# Patient Record
Sex: FEMALE | Race: WHITE | Employment: OTHER | URBAN - METROPOLITAN AREA
[De-identification: names, ages, dates, MRNs, and addresses within clinical notes are randomized per-mention and may not be internally consistent; named-entity substitution may affect disease eponyms.]

---

## 2017-01-01 ENCOUNTER — TELEPHONE (OUTPATIENT)
Dept: ONCOLOGY | Age: 67
End: 2017-01-01

## 2017-01-01 ENCOUNTER — APPOINTMENT (OUTPATIENT)
Dept: INFUSION THERAPY | Age: 67
End: 2017-01-01

## 2017-01-01 ENCOUNTER — DOCUMENTATION ONLY (OUTPATIENT)
Dept: ONCOLOGY | Age: 67
End: 2017-01-01

## 2017-01-01 ENCOUNTER — PATIENT OUTREACH (OUTPATIENT)
Dept: ONCOLOGY | Age: 67
End: 2017-01-01

## 2017-01-01 ENCOUNTER — TELEPHONE (OUTPATIENT)
Dept: ENDOCRINOLOGY | Age: 67
End: 2017-01-01

## 2017-01-01 ENCOUNTER — HOSPITAL ENCOUNTER (OUTPATIENT)
Dept: INFUSION THERAPY | Age: 67
Discharge: HOME | End: 2017-02-08
Payer: MEDICARE

## 2017-01-01 ENCOUNTER — OFFICE VISIT (OUTPATIENT)
Dept: ONCOLOGY | Age: 67
End: 2017-01-01

## 2017-01-01 ENCOUNTER — HOSPITAL ENCOUNTER (OUTPATIENT)
Dept: MRI IMAGING | Age: 67
Discharge: HOME | End: 2017-01-16
Attending: INTERNAL MEDICINE
Payer: MEDICARE

## 2017-01-01 ENCOUNTER — HOSPITAL ENCOUNTER (OUTPATIENT)
Dept: INFUSION THERAPY | Age: 67
End: 2017-01-01
Payer: MEDICARE

## 2017-01-01 VITALS
TEMPERATURE: 97.8 F | HEART RATE: 93 BPM | HEIGHT: 62 IN | OXYGEN SATURATION: 95 % | RESPIRATION RATE: 16 BRPM | SYSTOLIC BLOOD PRESSURE: 119 MMHG | DIASTOLIC BLOOD PRESSURE: 91 MMHG

## 2017-01-01 VITALS
TEMPERATURE: 97.3 F | DIASTOLIC BLOOD PRESSURE: 90 MMHG | SYSTOLIC BLOOD PRESSURE: 144 MMHG | WEIGHT: 263 LBS | HEIGHT: 62 IN | BODY MASS INDEX: 48.4 KG/M2 | OXYGEN SATURATION: 98 % | RESPIRATION RATE: 20 BRPM | HEART RATE: 87 BPM

## 2017-01-01 VITALS
OXYGEN SATURATION: 95 % | SYSTOLIC BLOOD PRESSURE: 129 MMHG | HEART RATE: 88 BPM | RESPIRATION RATE: 16 BRPM | TEMPERATURE: 97 F | DIASTOLIC BLOOD PRESSURE: 88 MMHG

## 2017-01-01 VITALS — BODY MASS INDEX: 48.47 KG/M2 | WEIGHT: 265 LBS

## 2017-01-01 DIAGNOSIS — R19.7 DIARRHEA, UNSPECIFIED TYPE: Primary | ICD-10-CM

## 2017-01-01 DIAGNOSIS — G93.5 NEOPLASM OF BRAIN CAUSING MASS EFFECT ON ADJACENT STRUCTURES (HCC): ICD-10-CM

## 2017-01-01 DIAGNOSIS — G93.5 NEOPLASM OF BRAIN CAUSING MASS EFFECT ON ADJACENT STRUCTURES (HCC): Primary | ICD-10-CM

## 2017-01-01 DIAGNOSIS — R06.09 DOE (DYSPNEA ON EXERTION): ICD-10-CM

## 2017-01-01 DIAGNOSIS — C34.90 MALIGNANT NEOPLASM OF LUNG, UNSPECIFIED LATERALITY, UNSPECIFIED PART OF LUNG (HCC): Primary | ICD-10-CM

## 2017-01-01 DIAGNOSIS — C34.11 MALIGNANT NEOPLASM OF UPPER LOBE OF RIGHT LUNG (HCC): Primary | ICD-10-CM

## 2017-01-01 DIAGNOSIS — C79.31 BRAIN METASTASES (HCC): ICD-10-CM

## 2017-01-01 DIAGNOSIS — T45.1X5A CARDIOMYOPATHY DUE TO CHEMOTHERAPY (HCC): ICD-10-CM

## 2017-01-01 DIAGNOSIS — I42.7 CARDIOMYOPATHY DUE TO CHEMOTHERAPY (HCC): ICD-10-CM

## 2017-01-01 DIAGNOSIS — C79.31 METASTATIC CANCER TO BRAIN (HCC): ICD-10-CM

## 2017-01-01 DIAGNOSIS — R53.1 GENERALIZED WEAKNESS: ICD-10-CM

## 2017-01-01 DIAGNOSIS — D49.6 NEOPLASM OF BRAIN CAUSING MASS EFFECT ON ADJACENT STRUCTURES (HCC): Primary | ICD-10-CM

## 2017-01-01 DIAGNOSIS — E27.49 IATROGENIC ADRENAL INSUFFICIENCY (HCC): ICD-10-CM

## 2017-01-01 DIAGNOSIS — M54.5 BILATERAL LOW BACK PAIN, UNSPECIFIED CHRONICITY, WITH SCIATICA PRESENCE UNSPECIFIED: ICD-10-CM

## 2017-01-01 DIAGNOSIS — R26.89 BALANCE PROBLEMS: ICD-10-CM

## 2017-01-01 DIAGNOSIS — D49.6 NEOPLASM OF BRAIN CAUSING MASS EFFECT ON ADJACENT STRUCTURES (HCC): ICD-10-CM

## 2017-01-01 DIAGNOSIS — C34.11 MALIGNANT NEOPLASM OF UPPER LOBE OF RIGHT LUNG (HCC): ICD-10-CM

## 2017-01-01 DIAGNOSIS — R41.3 MEMORY CHANGES: ICD-10-CM

## 2017-01-01 DIAGNOSIS — I50.22 CHRONIC SYSTOLIC HEART FAILURE (HCC): ICD-10-CM

## 2017-01-01 DIAGNOSIS — R53.1 WEAKNESS GENERALIZED: ICD-10-CM

## 2017-01-01 DIAGNOSIS — Z92.3 HISTORY OF THERAPEUTIC RADIATION: ICD-10-CM

## 2017-01-01 DIAGNOSIS — R53.0 NEOPLASTIC MALIGNANT RELATED FATIGUE: ICD-10-CM

## 2017-01-01 DIAGNOSIS — C34.91 LOCAL RECURRENCE OF CANCER OF RIGHT LUNG (HCC): ICD-10-CM

## 2017-01-01 DIAGNOSIS — R05.9 COUGH: ICD-10-CM

## 2017-01-01 LAB
ALBUMIN SERPL BCP-MCNC: 2.8 G/DL (ref 3.5–5)
ALBUMIN/GLOB SERPL: 1.2 {RATIO} (ref 1.1–2.2)
ALP SERPL-CCNC: 68 U/L (ref 45–117)
ALT SERPL-CCNC: 20 U/L (ref 12–78)
ANION GAP BLD CALC-SCNC: 12 MMOL/L (ref 5–15)
AST SERPL W P-5'-P-CCNC: 6 U/L (ref 15–37)
BASOPHILS # BLD AUTO: 0 K/UL (ref 0–0.1)
BASOPHILS # BLD: 0 % (ref 0–1)
BILIRUB SERPL-MCNC: 1.3 MG/DL (ref 0.2–1)
BUN SERPL-MCNC: 33 MG/DL (ref 6–20)
BUN/CREAT SERPL: 30 (ref 12–20)
CALCIUM SERPL-MCNC: 8.5 MG/DL (ref 8.5–10.1)
CHLORIDE SERPL-SCNC: 96 MMOL/L (ref 97–108)
CO2 SERPL-SCNC: 26 MMOL/L (ref 21–32)
CREAT SERPL-MCNC: 1.11 MG/DL (ref 0.55–1.02)
DIFFERENTIAL METHOD BLD: ABNORMAL
EOSINOPHIL # BLD: 0 K/UL (ref 0–0.4)
EOSINOPHIL NFR BLD: 0 % (ref 0–7)
ERYTHROCYTE [DISTWIDTH] IN BLOOD BY AUTOMATED COUNT: 15.2 % (ref 11.5–14.5)
GLOBULIN SER CALC-MCNC: 2.4 G/DL (ref 2–4)
GLUCOSE SERPL-MCNC: 354 MG/DL (ref 65–100)
HCT VFR BLD AUTO: 33.5 % (ref 35–47)
HGB BLD-MCNC: 11.2 G/DL (ref 11.5–16)
LYMPHOCYTES # BLD AUTO: 5 % (ref 12–49)
LYMPHOCYTES # BLD: 0.4 K/UL (ref 0.8–3.5)
MAGNESIUM SERPL-MCNC: 1.7 MG/DL (ref 1.6–2.4)
MCH RBC QN AUTO: 30.9 PG (ref 26–34)
MCHC RBC AUTO-ENTMCNC: 33.4 G/DL (ref 30–36.5)
MCV RBC AUTO: 92.3 FL (ref 80–99)
MONOCYTES # BLD: 0.1 K/UL (ref 0–1)
MONOCYTES NFR BLD AUTO: 1 % (ref 5–13)
NEUTS SEG # BLD: 6.6 K/UL (ref 1.8–8)
NEUTS SEG NFR BLD AUTO: 94 % (ref 32–75)
PLATELET # BLD AUTO: 93 K/UL (ref 150–400)
POTASSIUM SERPL-SCNC: 4.1 MMOL/L (ref 3.5–5.1)
PROT SERPL-MCNC: 5.2 G/DL (ref 6.4–8.2)
RBC # BLD AUTO: 3.63 M/UL (ref 3.8–5.2)
RBC MORPH BLD: ABNORMAL
SODIUM SERPL-SCNC: 134 MMOL/L (ref 136–145)
WBC # BLD AUTO: 7.1 K/UL (ref 3.6–11)

## 2017-01-01 PROCEDURE — 74011250636 HC RX REV CODE- 250/636: Performed by: INTERNAL MEDICINE

## 2017-01-01 PROCEDURE — 74011250636 HC RX REV CODE- 250/636: Performed by: NURSE PRACTITIONER

## 2017-01-01 PROCEDURE — 96361 HYDRATE IV INFUSION ADD-ON: CPT

## 2017-01-01 PROCEDURE — 36415 COLL VENOUS BLD VENIPUNCTURE: CPT | Performed by: NURSE PRACTITIONER

## 2017-01-01 PROCEDURE — 70553 MRI BRAIN STEM W/O & W/DYE: CPT

## 2017-01-01 PROCEDURE — 80053 COMPREHEN METABOLIC PANEL: CPT | Performed by: NURSE PRACTITIONER

## 2017-01-01 PROCEDURE — 83735 ASSAY OF MAGNESIUM: CPT | Performed by: NURSE PRACTITIONER

## 2017-01-01 PROCEDURE — A9585 GADOBUTROL INJECTION: HCPCS | Performed by: INTERNAL MEDICINE

## 2017-01-01 PROCEDURE — 85025 COMPLETE CBC W/AUTO DIFF WBC: CPT | Performed by: NURSE PRACTITIONER

## 2017-01-01 PROCEDURE — 96360 HYDRATION IV INFUSION INIT: CPT

## 2017-01-01 RX ORDER — ONDANSETRON 8 MG/1
TABLET, ORALLY DISINTEGRATING ORAL
Qty: 30 TAB | Refills: 5 | Status: SHIPPED | OUTPATIENT
Start: 2017-01-01

## 2017-01-01 RX ORDER — DEXAMETHASONE 4 MG/1
4 TABLET ORAL 2 TIMES DAILY WITH MEALS
Qty: 60 TAB | Refills: 1 | Status: SHIPPED | OUTPATIENT
Start: 2017-01-01

## 2017-01-01 RX ORDER — FAMOTIDINE 20 MG/1
20 TABLET, FILM COATED ORAL 2 TIMES DAILY
Qty: 60 TAB | Refills: 1 | Status: SHIPPED | OUTPATIENT
Start: 2017-01-01

## 2017-01-01 RX ORDER — DEXAMETHASONE 4 MG/1
TABLET ORAL
Qty: 30 TAB | Refills: 1 | Status: CANCELLED | OUTPATIENT
Start: 2017-01-01

## 2017-01-01 RX ORDER — ACETAMINOPHEN 325 MG/1
TABLET ORAL
COMMUNITY

## 2017-01-01 RX ORDER — DEXAMETHASONE 2 MG/1
TABLET ORAL
Qty: 60 TAB | Refills: 1 | Status: SHIPPED | OUTPATIENT
Start: 2017-01-01

## 2017-01-01 RX ORDER — LEVETIRACETAM 500 MG/1
500 TABLET ORAL 2 TIMES DAILY
Qty: 60 TAB | Refills: 1 | Status: SHIPPED | OUTPATIENT
Start: 2017-01-01

## 2017-01-01 RX ADMIN — GADOBUTROL 12 ML: 604.72 INJECTION INTRAVENOUS at 12:14

## 2017-01-01 RX ADMIN — SODIUM CHLORIDE 1000 ML: 900 INJECTION, SOLUTION INTRAVENOUS at 13:15

## 2017-01-03 NOTE — TELEPHONE ENCOUNTER
Call from daughter/Annette. HIPAA verified. Stated patient is unable to remember anything. Stated taking 2 oxycodone daily per home health med set up and acetaminophen 3000 mg daily. Stated home health nurse advised to DC oxy use to see if memory improved. Stated patient is now taking acetaminophen daily and 1 oxy at night. Stated patient reports ongoing shoulder pain, but headache less. Also reported low back pain. Denied fever/chills/urgency. Reviewed sx to report to office and advised of 24 hour provider call service. Kimmy Marsh requested info on hospice and role discussed. Advised RN would forward to provider and navigator for assist also. Annette to call clinic tomorrow with status update/sooner if questions. Is aware to take patient to ED for acute changes if desired.   (18 min)

## 2017-01-03 NOTE — TELEPHONE ENCOUNTER
----- Message from Dandy Clay sent at 1/3/2017  9:10 AM EST -----  Regarding: Dr. Latha Shaw  Patient's daughter, Marleni Carrion, would like a call back within the next hour if possible to discuss her mom's condition. Best call back number is [phone].

## 2017-01-03 NOTE — TELEPHONE ENCOUNTER
Message left with Jahaira Zhang to contact [employer] about delivery of oral chemo. Call returned from daughter/Annette. Stated patient recd call from [employer] that iressa was approved. Gave contact # for pharmacy and requested call to clinic when med recd to review. Expressed concern that med needs to be monitored for administration due to patient's confusion. Daughter verbalized understanding and thanked for call.

## 2017-01-04 NOTE — TELEPHONE ENCOUNTER
Call from daughter Olla Simmonds. HIPAA verified. Stated patient is less confused today. Still having some back pain, but ok on tylenol. Reviewed sx of UTI to contact office or PCP. Verbalized understanding. Per daughter insurance will cover 35 hours of home health assist in home. Advised to have [facility] fax requested orders or call for any needs. Support given. Daughter will call with update 1/5/17.

## 2017-01-04 NOTE — TELEPHONE ENCOUNTER
Stewart Ventura, daughter, is returning a call to [facility] to report on patient's condition and information on in home care.

## 2017-01-04 NOTE — TELEPHONE ENCOUNTER
Call from daughter Grady Hall. Stated patient has not recd Iressa. Gave [place] contact # for patient reference. Call to [facility]/[phone]. Call connected x 2 then disconnected. Spoke with Jeannie Acevedo [phone]. Stated med is not on formulary. [facility] [phone] is home delivery pharmacy. Transferred to [facility]. Stated PA approved and medication should have shipped through [facility]. Per representative [facility] medication is not on formulary. Advised RN aware and need to know which pharmacy can vend medication as these are preferred though patient's insurance. Spoke with supervisor and recommendation was to contact  or other specialty pharmacy. RN requested that PA be faxed to our office. [phone]   (27  Min)      Call to Jessica @ [phone] to see if medication available. Fax:  [phone]. Script faxed complete. Call to daughter and advised that script faxed complete to [address] [place] [address]. Daughter will contact pharmacy also for pricing.

## 2017-01-05 NOTE — TELEPHONE ENCOUNTER
Call from Marybeth. States that [employer] medication has not yet been received. Anisha Rios states that Clara's back pain is \"not getting better\", and would like to know if this is concerning.

## 2017-01-05 NOTE — TELEPHONE ENCOUNTER
Call returned to Jeremy Marin. Spoke with Grady Hall. Stated ongoing low back pain. Is not certain of patient is constipated or having urinary sx. RN requested to verify sx and return call. Reviewed sx of UTI and to report fever. Verbalized understanding. To return call.

## 2017-01-05 NOTE — TELEPHONE ENCOUNTER
Call to [facility] [address] [facility]. [phone]. Stated can dispense Iressa. Chemo script and clinical info faxed complete to [facility] @ [phone]. Call returned to [place]. HIPAA verified. Advised of above action on iressa. Verbalized understanding. Stated patient reports back pain resolves during day. Reviewed sx to report to clinic. Family verbalized understanding of plan.   Thanked for assist.

## 2017-01-05 NOTE — TELEPHONE ENCOUNTER
Call to daughter to advise we will be working on the Iressa with [facility] tomorrow. Thanked for call.

## 2017-01-05 NOTE — TELEPHONE ENCOUNTER
[phone]    Call from daughter. Stated [facility] is unable to obtain iressa as is on recall. Gave 800 # supplied by pharmacy for contact. Call to above #. Jennifer. Provider must enroll in program:   [phone]. Medication not commercially available since September 2011. Can call the [place] line for options to Rx.  [phone]    To provider for direction.

## 2017-01-06 NOTE — PROGRESS NOTES
Returned daughter's call  She has been told by Medicare and the [employer] supplement that her mother can get support in home for up to 35 hours a week covered at 100%. The daughter has been unable to find a company that will do that. The home health company will send an aide for an hour twice a week. They feel their mother needs more than this due to intermittent confusion and forgetfulness. Active listening provided. Daughter spoke with their current home health company and they had a long conversation about hospice. Daughter feels she doesn't know her mother's prognosis well enough. She suspects that her mother will not live a year, but is unsure if she has less than 6 months. She would like this information to help them plan how to best care for her parents. NN will research process with [facility] and forward above request to providers. NN to follow up with daughter next week regarding information obtained from [employer].

## 2017-01-06 NOTE — TELEPHONE ENCOUNTER
Call to [facility] [address] [facility]. $6682 co-pay and pharmacy will assist with cortes. Requested pharmacy contact daughter Melinda Tran and supplied contact info. Thanked for assist.    Call to ramos Tran and advised of above and that will need income verification. Verbalized understanding and thanked for call.

## 2017-01-09 NOTE — TELEPHONE ENCOUNTER
Call from daughter [facility] re:  Clinical info for cortes given to obtain iressa. Support given.

## 2017-01-10 NOTE — TELEPHONE ENCOUNTER
Call from daughter Parisa Ayala per [facility]. Call lost and returned. Stated patient should receive Iressa 1/11/17. Advised FMLA complete and will fax. Requested 10 days off per month/updated. Offered sooner appt for follow up after MRI. Advised would forward to [facility] for appt.

## 2017-01-10 NOTE — TELEPHONE ENCOUNTER
Call to [address]. Stated is waiting for [employer]/co-pay assist processing. Stated co-pay is $2900 for 1st med delivery and then needs to pay 5% or $300/mo unless co-pay assist recd.       ([phone])

## 2017-01-11 NOTE — TELEPHONE ENCOUNTER
Pepcid can decrease plasma concentrations of the Iressa so it is not harmful for her to take the Iressa after taking the Pepcid today, but would like to avoid administration together in order to maximize absorption of Iressa. She can take the Iressa 6 hours before or 6 hours after Pepcid but should avoid closer timing between the 2 medications.

## 2017-01-11 NOTE — TELEPHONE ENCOUNTER
HIPAA verified. Stated was advised that pepcid may interact with iressa and that patient has taken pepcid today. Advised would clarify with provider and return call.   Thanked for assist.

## 2017-01-11 NOTE — TELEPHONE ENCOUNTER
HIPAA verified. Advised of NP note. Reported patient having spasms in back. Has MRI appt Monday and clinic appt Thursday. Daughter stated would try lidocaine patch to back to see if relief and call office with update. Suggested notes to patient regarding medication administration to decrease anxiety. Appreciative of assist.  Support given.

## 2017-01-12 NOTE — PROGRESS NOTES
[employer]  Medical Oncology at [address] [place] [address]   Nurse Navigator Note      Spoke with patient's daughter, Georgina. Discussed limitations of Medicare support for aides in the home. She and her sister are working together to interview/find help in patient's home. Reviewed patient's recent fall and ongoing safety concerns. Georgina will be unable to attend patient's upcoming appointment next week. She still desires the opportunity to speak with Dr. Kecia Machuca regarding prognosis to assist in planning future care for mother. NN will forward above request to provider. Daughter understands provider away until Tuesday, 1/17/17. Daughter verbalized appreciation for support and agrees to call with further questions or concerns.

## 2017-01-12 NOTE — TELEPHONE ENCOUNTER
Returned call to daughter Laly . HIPAA verified. States there were 2 sections on FMLA form that needed completion. Areas have been corrected & refaxed. Daughter confirms she is aware of patient's fall today per home therapist.  Daughter states she left work & went to patients home to check on her. Reports no visible injuries & no complaints voiced by patient. She plans to go back & check on her again later this afternoon. Advised to call this office for any changes or concerns. She verbalized understanding.

## 2017-01-12 NOTE — TELEPHONE ENCOUNTER
Judy Barrett, [facility] therapy is calling to report a patient fall. She states that when she went to patient's home, patient was with her caregiver and had fallen and hit her Right knee and also her Left temple. She states that rescue squad was called however patient refused to go to the ED. Judy Barrett also states that she feels like patient needs to have a bedside commode ordered due to patient's bathroom set up. Order can be faxed to [phone].

## 2017-01-16 NOTE — TELEPHONE ENCOUNTER
Called patient's daughter. The patient's back pain has been getting worse. She was with the patient this weekend and pain seems to be more constant. She reports it is also hard to decipher because she has [place] lot of anxiety and maybe a little bit of depression setting in.\" She does not want to take the oxycodone more than once at night because it makes her \"loopy. \" She feels there is regression and no progression. For example, patient did not remember she had seen her daughter the day before. Patient has also had several hallucinations (ie. Patient told her  she saw a dog pulling sleds). She reports hallucinations did not seem to coincide with starting the medication. Daughter feels a very \"candid conversation needs to be had with her. \" Patient fell last week and family is working to make sure something is with her full-time. Daughter reports she is \"not herself, this is now who she is. \" Daughter is not opposed to hospice and does not want patient to be in as much pain as she is in. Discussed that patient can 1/2 tablet oxycodone every 3-4 hours to see if she tolerates this better. We could also change to hydrocodone-acetaminophen as well to see if this is better tolerated. Daughter reports she will talk with the patient and her sister and let us know what is decided. Discussed referral to palliative care as well and Pablo Coronado says they will think about this. Asked if patient was off dexamethasone and Pablo Coronado will check. If she is off dexamethasone, she may need to restart to see if this helps with symptoms as well. Pablo Coronado will call back with update on dexamethasone. Patient has an appointment with Dr. Baron Goyal on 1/19/17 and will keep that at this time. Advised to call back with any further questions.

## 2017-01-16 NOTE — TELEPHONE ENCOUNTER
----- Message from Dean Headley sent at 1/16/2017  4:26 PM EST -----  Regarding: Patient call  Patient is requesting a call back in regards to her medication. She can be reached at [phone]. Thank you. Fortino De  --------------  Say Herrera, daughter, on HIPPA states that Dr. Laura Layton wants patient to take Decadron again and stop Hydrocortisone. She is on week 4 of Hydrocortisone taper. She wants to know if needs to wean Hydrocortisone prior to starting Decadron. Per Dr. Ariela Romo, [facility] to stop Hydrocortisone and start Decadron 4 mg BID, as prescribed by Dr. Laura Layton. . Daughter expressed understanding.

## 2017-01-16 NOTE — TELEPHONE ENCOUNTER
Call from daughter/Annette. HIPAA verified. Has questions about amount of funding [employer] will cover for iressa. Referred to follow up with [facility]. Verbalized understanding. Stated is concerned about ongoing back pain issues with patient and wanted to know if needs full body scan as is having brain MRI today. Reviewed pain management recommendations. Stated patient is taking extra strength tylenol 2 tabs every 8 hours and oxycodone only at night. Reviewed can take the oxycodone as directed. Patient stated made her groggy in past.  Discussed role of palliative care in plan. Daughter verbalized concerns over home safety and personal care assist.  Home health is in place. Advised would forward to provider for direction. Next office visit 1/19/17.

## 2017-01-16 NOTE — TELEPHONE ENCOUNTER
Returned call to patient's daughter, Pancho Marcos. Patient is off dexamethasone and has been taking hydrocortisone. Pancho Marcos is not sure if she started the hydrocortisone after stopping the dexamethasone or earlier. She asked that I call Fannygordon Pimentelner, her sister who is with the patient's medications. [facility]. She reports the patient's pain is mid to lower back, just above the waistband of her pants. Family is asking about MRI of the spine. MRI ordered at this time. Fanny Carrington states patient's hydrocortisone is managed by endocrinology. Will prescribe dexamethasone 4 mg BID for now to see if this helps with neurologic symptoms and pain. Advised that they call Dr. Cindy Capellan office (endocrinology) to see if they should stop hydrocortisone with initiation of dexamethasone. Prescription sent to pharmacy.

## 2017-01-16 NOTE — TELEPHONE ENCOUNTER
Patient daughter, Olla Simmonds, calling back to let Christina Daly know that the patient is not currently on the Decadron. Olla Simmonds also wanted to know if the MRI of the Back that was talked about could be done on Thursday when she comes up here to see Dr. Russel Lester. Patient had the Brain MRI done today and she had a significant amount of pain with it during the exam and on the ride home. She still has a significant amount of pain now that they are home. Please call the patient daughter Olla Simmonds back at [phone].  Thanks

## 2017-01-18 NOTE — TELEPHONE ENCOUNTER
Patient's daughter Fahad Handing calling to update Dr. Sarmad Scott and Danie Pena that the prescription for Decadron has significantly improved patient's pain.

## 2017-01-19 PROBLEM — C79.31 METASTATIC CANCER TO BRAIN (HCC): Status: ACTIVE | Noted: 2017-01-01

## 2017-01-19 PROBLEM — R53.1 GENERALIZED WEAKNESS: Status: ACTIVE | Noted: 2017-01-01

## 2017-01-19 PROBLEM — R53.0 NEOPLASTIC MALIGNANT RELATED FATIGUE: Status: ACTIVE | Noted: 2017-01-01

## 2017-01-19 NOTE — TELEPHONE ENCOUNTER
HIPAA verified. Advised that best time for conference call with daughters is between 11a-1p 1/20/17. Annette:  [phone]  Rudi:   [phone]    Note to provider.

## 2017-01-19 NOTE — PROGRESS NOTES
Delfino Torres is a 78 y.o. female here today for follow up of lung cancer. Occasional pain in neck & waist area.
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
## 2017-01-19 NOTE — MR AVS SNAPSHOT
Visit Information Date & Time Provider Department Dept. Phone Encounter #  
 1/19/2017  9:00 AM Scotty Lemons, [address]  Oncology at [address] [id number] Your Appointments 3/23/2017 11:30 AM  
Follow Up with Beny Aldana MD  
[facility] Diabetes and Endocrinology [facility]) Appt Note: 3 month f/u  
 [address] [facility] [address] [id number] [facility] Upcoming Health Maintenance Date Due Hepatitis C Screening 1950 COLONOSCOPY 5/20/1967 DTaP/Tdap/Td series (1 - Tdap) 5/6/2009 ZOSTER VACCINE AGE 60> 5/20/2009 GLAUCOMA SCREENING Q2Y 5/20/2014 MEDICARE YEARLY EXAM 5/20/2014 BREAST CANCER SCRN MAMMOGRAM 2/6/2017 Allergies as of 1/19/2017  Review Complete On: 1/19/2017 By: Scotty Lemons, DO Severity Noted Reaction Type Reactions Biaxin [Clarithromycin]  07/10/2013    Unknown (comments) Unsure of reaction. ?nausea. Erythromycin  07/10/2013    Unknown (comments)  
 ml Zandra fine. Unsure of reaction. ?nausea. Sulfa (Sulfonamide Antibiotics)  07/10/2013    Unknown (comments) QUINOLONES AND ZITHROMAX OKAY. Unsure of reaction. ?nausea. Current Immunizations  Reviewed on 1/19/2017 Name Date Influenza Vaccine 9/9/2016, 11/3/2015, 11/3/2015, 10/1/2014, 11/15/2013 Pneumococcal Vaccine (Unspecified Type) 11/11/2014, 10/24/2011 Td 5/5/2009 Reviewed by Faye Penaloza LPN on 6/43/0198 at  9:30 AM  
You Were Diagnosed With   
  
 Codes Comments Malignant neoplasm of upper lobe of right lung ([facility]    -  Primary ICD-10-CM: C34.11 ICD-9-CM: 162.3 Metastatic cancer to brain [facility])     ICD-10-CM: C79.31 
ICD-9-CM: 198.3 Cardiomyopathy due to chemotherapy ([facility]     ICD-10-CM: I42.7, T45.1X5A 
ICD-9-CM: 425.9, E933.1 Neoplastic malignant related fatigue     ICD-10-CM: R53.0 ICD-9-CM: 780.79 Generalized weakness     ICD-10-CM: R53.1 ICD-9-CM: 780.79 Memory changes     ICD-10-CM: R41.3 ICD-9-CM: 780.93 History of therapeutic radiation     ICD-10-CM: Z92.3 ICD-9-CM: V15.3 Vitals BP Pulse Temp Resp Height(growth percentile) Weight(growth percentile) 144/90 87 97.3 °F (36.3 °C) (Oral) 20 5' 2\" (1.575 m) 263 lb (119.3 kg) SpO2 BMI OB Status Smoking Status 98% 48.1 kg/m2 Hysterectomy Never Smoker Vitals History BMI and BSA Data Body Mass Index Body Surface Area  
 48.1 kg/m 2 2.28 m 2 Preferred Pharmacy Pharmacy Name Phone [facility] [phone] [phone] Your Updated Medication List  
  
   
This list is accurate as of: 1/19/17 10:37 AM.  Always use your most recent med list.  
  
  
  
  
 albuterol 90 mcg/actuation inhaler Commonly known as:  PROVENTIL HFA, VENTOLIN HFA, PROAIR HFA Take 2 Puffs by inhalation every four (4) hours as needed. benzonatate 200 mg capsule Commonly known as:  TESSALON  
  
 carvedilol 25 mg tablet Commonly known as:  Elliott Karissa Take 25 mg by mouth two (2) times a day. CENTRUM SILVER ULTRA WOMEN'S Tab tablet Generic drug:  multivit-mineral-iron-lutein Take 1 Tab by mouth daily. clobetasol 0.05 % external solution Commonly known as:  Lauro Reagin Apply  to affected area two (2) times daily as needed (scalp irritation). clonazePAM 1 mg tablet Commonly known as:  Marisela Blaze Take 1 Tab by mouth nightly. Max Daily Amount: 1 mg.  
  
 dexamethasone 4 mg tablet Commonly known as:  DECADRON Take 1 Tab by mouth two (2) times daily (with meals). DULoxetine 60 mg capsule Commonly known as:  CYMBALTA Take 60 mg by mouth daily. famotidine 20 mg tablet Commonly known as:  PEPCID Take 1 Tab by mouth two (2) times a day. folic acid 599 mcg tablet Take 400 mcg by mouth daily. furosemide 40 mg tablet Commonly known as:  LASIX Take 40 mg by mouth daily as needed (edema). gefitinib 250 mg tablet Commonly known as:  IRESSA Take 1 Tab by mouth daily for 30 days. Indications: EGFR POSITIVE NON-SMALL CELL LUNG CANCER  
  
 hydrocortisone 10 mg tablet Commonly known as:  CORTEF Take 1 Tab by mouth daily. 20mg in the morning and 20mg in the afternoon  
  
 levETIRAcetam 500 mg tablet Commonly known as:  KEPPRA Take 1 Tab by mouth two (2) times a day. lidocaine 5 % Commonly known as:  LIDODERM  
1 Patch by TransDERmal route every twenty-four (24) hours. Apply patch to the affected area for 12 hours a day and remove for 12 hours a day. magic mouthwash solution Magic mouth wash  Maalox Lidocaine 2% viscous  Diphenhydramine oral solution   Pharmacy to mix equal portions of ingredients to a total volume as indicated in the dispense amount. Swish and swallow four times daily as needed for mouth soreness. MIRALAX 17 gram packet Generic drug:  polyethylene glycol Take 17 g by mouth nightly. montelukast 10 mg tablet Commonly known as:  SINGULAIR Take 10 mg by mouth daily. nystatin 100,000 unit/mL suspension Commonly known as:  MYCOSTATIN Take 5 mL by mouth four (4) times daily. swish and spit  
  
 ondansetron 8 mg disintegrating tablet Commonly known as:  ZOFRAN ODT  
dissolve 1 tablet ON TONGUE every 8 hours if needed for nausea OPDIVO 100 mg/10 mL Soln chemo injection Generic drug:  nivolumab  
240 mg by IntraVENous route every fourteen (14) days. oxyCODONE IR 5 mg immediate release tablet Commonly known as:  Jessica Ruthy Take 1 Tab by mouth every four (4) hours as needed for Pain. Max Daily Amount: 30 mg. SPIRIVA WITH HANDIHALER 18 mcg inhalation capsule Generic drug:  tiotropium Take 1 Cap by inhalation daily. TYLENOL 325 mg tablet Generic drug:  acetaminophen Take  by mouth every four (4) hours as needed for Pain. VITAMIN C 500 mg tablet Generic drug:  ascorbic acid (vitamin C) Take 500 mg by mouth daily. VITAMIN D3 2,000 unit Tab Generic drug:  cholecalciferol (vitamin D3) Take 2,000 Units by mouth daily. [address] [facility] Met lung cancer To-Do List   
 01/26/2017 1:00 PM  
  Appointment with [facility] MRI 2 at [facility] MRI Department ([phone]) 1. Please bring a list or a bag of your current medications to your appointment 2. Please be sure to remove ALL hair clips, pins, extensions, etc., prior to arriving for your MRI procedure. 3. Bring any non [facility] films or CDs pertaining to the area being imaged with you on the day of appointment. 4. A written order with a valid diagnosis and Physicians  signature is required for all scheduled tests. 5. Check in at registration 30min before your appointment time unless you were instructed to do otherwise. 01/26/2017 1:45 PM  
  Appointment with [facility] MRI 2 at [facility] MRI Department ([phone]) 1. Please bring a list or a bag of your current medications to your appointment 2. Please be sure to remove ALL hair clips, pins, extensions, etc., prior to arriving for your MRI procedure. 3. Bring any non [facility] films or CDs pertaining to the area being imaged with you on the day of appointment. 4. A written order with a valid diagnosis and Physicians  signature is required for all scheduled tests. 5. Check in at registration 30min before your appointment time unless you were instructed to do otherwise. Introducing [facility] HEALTH SERVICES! [facility] introduces [employer] patient portal. Now you can access parts of your medical record, email your doctor's office, and request medication refills online. 1. In your internet browser, go to [email or web address] '[email or web address] Tap 2. Click on the First Time User? Click Here link in the Sign In box.  You will see the New Member Sign Up page. 3. Enter your [employer] Access Code exactly as it appears below. You will not need to use this code after youve completed the sign-up process. If you do not sign up before the expiration date, you must request a new code. · [employer] Access Code: Rosendo Jane Expires: 2/12/2017  7:56 AM 
 
4. Enter the last four digits of your Social Security Number (xxxx) and Date of Birth (mm/dd/yyyy) as indicated and click Submit. You will be taken to the next sign-up page. 5. Create a Maven7t ID. This will be your [employer] login ID and cannot be changed, so think of one that is secure and easy to remember. 6. Create a [employer] password. You can change your password at any time. 7. Enter your Password Reset Question and Answer. This can be used at a later time if you forget your password. 8. Enter your e-mail address. You will receive e-mail notification when new information is available in [address]. 9. Click Sign Up. You can now view and download portions of your medical record. 10. Click the Download Summary menu link to download a portable copy of your medical information. If you have questions, please visit the Frequently Asked Questions section of the [employer] website. Remember, [employer] is NOT to be used for urgent needs. For medical emergencies, dial 911. Now available from your iPhone and Android! Please provide this summary of care documentation to your next provider. Your primary care clinician is listed as Meme Jha. If you have any questions after today's visit, please call [phone].

## 2017-01-19 NOTE — PROGRESS NOTES
[employer]  Medical Oncology at [facility]   Nurse Navigator Note    Met with patient and friend, [facility] [employer] prior to visit with Dr. Nadira Go. Patient arrives in wheelchair. Patient states they have hired help in the home 5 days a week. She feels well supported and denies needs currently. Patient uses a concentrator on her CPAP and the company who provides this service is merging with another. Her daughter, Nichol Vann is working with the [facility] [employer] for her. Patient is speaks of improving her overall physical status and of getting back to taking care of herself. Patient agrees to contact us with any questions or concerns.

## 2017-01-19 NOTE — TELEPHONE ENCOUNTER
Patient would like to clarify the phone meeting that scheduled for tomorrow. Please contact and advise.

## 2017-01-20 NOTE — TELEPHONE ENCOUNTER
Called and talked with daughter Pablo Coronado today  Family is not together to do a conference call as i had suggested to patient yesterday  Long discussion about overall poor prognosis  Home issues are psychosocial / need more help in home  Pt is hospice appropriate  They need more navigator support  Daughter wants us to order hospice and hopefully they will get to point where pt accepts this   Pbalo Coronado wants me to call her sister also

## 2017-01-20 NOTE — TELEPHONE ENCOUNTER
Called and talked to other daughter  Pt is better on decadron  Daughter Lyubov Mis understands pt's poor prognosis

## 2017-01-24 NOTE — TELEPHONE ENCOUNTER
Call returned to patient. HIPAA verified. Requested the followin. List of appointments  2. Letter mailed to patient listing all medications and IVs recd since last letter. 3.  Records be sent to the following providers of care over last 2-3 months:       -Dr. Cherie Lawson       -Dr. Warden Tripp       - Dr. Buck/pulmonary @ [facility]  RN will compile letter. Message to PSRs to verify records needed by the above providers. Support given to patient. To call with questions.

## 2017-01-24 NOTE — PROGRESS NOTES
[address]  Medical Oncology at [facility]   Nurse Navigator Note    NN reached out to daughter Mil Taylor. Mil Taylor stated they have found 2 reliable aides who are helping their mother in the home Monday through Friday. Her sister attempted to continue the hospice discussion over the weekend with their mother. Patient still does not feel she is in place where she needs this. Patient states that her pain is so much improved therefore this cannot be the right prognosis. Daughter shared that patient was crying in the morning about where she would be buried then asking why they were talking to her about hospice that evening. Active listening provided. Daughter understands that these varied emotional responses are not uncommon in patient's with brain mets. Daughter is tearful at times. She and her sister plan to arrange hospice info sessions outside of their mother's home so they will have established a connection prior to an urgent request to admit to hospice. Reviewed upcoming appointments---they would like to cancel the MRIs of patient's spine. Patient does not tolerate these well and they spoke with the provider at recent visit and determined they are unnecessary. NN will forward message to cancel. Daughter denies any further needs at this time. She agrees to contact us if we can be of support.

## 2017-01-24 NOTE — TELEPHONE ENCOUNTER
Patient's aide, [facility]  (not listed on HIPAA) is calling on behalf of the patient to clarify upcoming appointments. Please call patient to confirm that her appointments are correct.

## 2017-01-26 NOTE — TELEPHONE ENCOUNTER
Patient's daughter is requesting medication refills and a call back from [facility] to discuss a medication prescribed by the radiation oncologist.    Requested Prescriptions     Pending Prescriptions Disp Refills    levETIRAcetam (KEPPRA) 500 mg tablet 60 Tab 1     Sig: Take 1 Tab by mouth two (2) times a day.  famotidine (PEPCID) 20 mg tablet 60 Tab 1     Sig: Take 1 Tab by mouth two (2) times a day.

## 2017-01-26 NOTE — TELEPHONE ENCOUNTER
Returned call to [employer]. Advised that prescriptions were sent to pharmacy as requested. She reports they saw Dr. Nuria Garcia yesterday and it was discussed that she can decrease dexamethasone. Patient took away from the conversation that she should be taking dexamethasone every other day. Daughter is concerned that this is too quick of a taper for her. She is also not sure that her mother remembered everything that was said accurately. Discussed that can initiate a slower taper and decrease dosing to 4 mg in AM and 2 mg in evening. Daughter is happy with this plan. They will call back on Monday to let us know how she is feeling on lower dose. [employer] also expresses concern as Ms. Celi Miramontes went for a visit with radiation oncology yesterday and took away from the visit that she could expect to recover and improve. She was confused that this was different from her discussion with Dr. Raul Croft. Discussed with [employer] that patient does have a life-limiting illness and while we are unable to predict her exact life expectancy, would measure in months due to the stage and progression of her disease. Annette verbalizes understanding and asks if Dr. Raul Croft can speak with Dr. Nuria Garcia and call the patient at home. She reports Ms. Celi Miramontes is confused over what she believed was conflicting messages.

## 2017-01-26 NOTE — PROGRESS NOTES
Patient's daughter called stating dexamethasone was too small to cut in half. Dexamethasone 2 mg tablets sent to pharmacy. Patient to take 4 mg in the morning and 2 mg in the evening.

## 2017-01-27 NOTE — TELEPHONE ENCOUNTER
Patient calling back to speak to Dr. Lim Sofiya. Patient states that she thinks that she has her phone fixed now and she would like to try to talk to Dr. Lim Sofiya again. Please call the patient back at [phone].  Thanks

## 2017-01-27 NOTE — TELEPHONE ENCOUNTER
Called and talked with pt  Pt states she is confused  States radiation told her she didn't have a limited life expectancy  Reviewed that what we talked about with limited life to 6 months or less is most likely  Pt's phone wasn't working well and lost call at end  Pt told to call us back if she had more questions.

## 2017-01-30 NOTE — TELEPHONE ENCOUNTER
Spoke with Lona Almanza. HIPAA verified. Stated cut night time dose of decadron in half and patient seemed to be ok with pain control. Stated patient reports dizziness has increased especially with ambulation. Wanted to update NP. Message forwarded.

## 2017-01-30 NOTE — TELEPHONE ENCOUNTER
Patient daughter Lalo Prior calling to update Suzi Rinne about the medication that the patient has been taken. Cut dose of medications in half at night, no change in pain, feels as if it is okay to leave like that at night. AlsoDizziness is increasing, unsure if normal progression.  Please call the daughter back at [phone]

## 2017-01-31 NOTE — TELEPHONE ENCOUNTER
Patient daughter calling back to speak to Ubaldo or Toi. Please call the patient daughter back at [phone].  Thanks

## 2017-01-31 NOTE — TELEPHONE ENCOUNTER
Returned call to patient's daughter, [facility]. She confirmed that patient was tolerating the lower dose of dexamethasone in the evening. She has noticed increased dizziness/lightheadedness with standing but has had no falls. Discussed that this could be related to decreased dexamethasone. They have no way of checking her BP at home. Advised to call back if symptoms worsen or continue and may need to increase evening dexamethasone dose to 4 mg in the evening. Daughter verbalizes understanding. [facility] is concerned that home health has stopped coming to the house. Will re-consult home health as family felt that was helpful in terms of having patient assessment. She reports the patient now has a new phone and was concerned because she didn't finish her conversation with Dr. Maryann Crook on Friday. Will forward to Dr. Maryann Crook as well.

## 2017-01-31 NOTE — TELEPHONE ENCOUNTER
Spoke with Lea Krueger. HIPAA verified. Stated patient had previous services through [facility]    Call to [facility] @ [phone]. Advised patient is on service. Requested RN contact [phone]. Call placed and # no loger in service. Referral faxed to [phone] to extend services per referral/complete.

## 2017-02-01 NOTE — TELEPHONE ENCOUNTER
Called and talked with pt again  Again reviewed life limiting metastatic cancer dx  Could be less than 6 months  Pt tearful/ having a hard time processing info due to brain mets/ treatments  Family supportive

## 2017-02-01 NOTE — TELEPHONE ENCOUNTER
Mckenzie Plascencia from [facility] is calling to clarify Physical Therapy orders for patient. She can be reached at [phone].

## 2017-02-01 NOTE — TELEPHONE ENCOUNTER
Returned call to [facility] [place] [address]. Discussed that nursing and PT assessment is needed.

## 2017-02-03 NOTE — TELEPHONE ENCOUNTER
Call returned to [employer]. Stated decadron may decrease the effectiveness of iressa. Reviewed current dose of decadron. Advised would forward to provider. Thanked for call.

## 2017-02-03 NOTE — TELEPHONE ENCOUNTER
Serg Edmonds from [facility] would like to verify if patient is on steroids long term or short term.     [phone]

## 2017-02-06 NOTE — TELEPHONE ENCOUNTER
Returned call to patient's daughter. Dizziness has not increased. Home health and PT have both been by today. Discussed option of decreasing dexamethasone to 4 mg daily (dropping the 2 mg dose in the evening). Daughter is concerned because patient's energy level has been very low and they have had difficulty getting someone in the house to help her because she has refused care. Daughter expresses concern over decreasing the dexamethasone because she has been sleeping a lot during the day. Will leave dexamethasone dose the same for right now and will check in later this week. Daughter verbalizes understanding.

## 2017-02-06 NOTE — TELEPHONE ENCOUNTER
Skyla Ahumada from [facility] is requesting additional orders. She is hoping to see the patient once a week for 2 to 3 weeks.     Phone: [phone]

## 2017-02-06 NOTE — TELEPHONE ENCOUNTER
Talked with pt last week at home  Pt perhaps has unrealistic expectations about what she can do at home  Doesn't want much help at home and family wants to get more help   Reviewed again life limiting illness possibly less than 6 months or even 3 months  Unsure if pt is understanding prognosis  Family is very supportive  Pt will continue with office f/u

## 2017-02-06 NOTE — TELEPHONE ENCOUNTER
HIPAA verified. Stated dizziness no change over weekend with decreased decadron. Ongoing issues with home help as patient declines. Reviewed home safety. Stated patient fired previous care giver. Does have home health for assist.  Eliud Innocent would forward to provider and contact patient to review safety measures and tolerance of iressa. Call to patient. HIPAA verified. Stated home health visiting. Reviewed iressa side effects per [employer]. Patient reported fatigue, occasional stomach ache and nausea. Reviewed use of anti-emetics. Discussed safety measures to prevent falls in home. Patient stated all obstacles have been removed and is using walker. Advised labs to be checked at next office appt. Stated, \"I'm not ready to give up yet. \" Support given.

## 2017-02-07 NOTE — PROGRESS NOTES
HIPAA verified. Advised RN did not call late in day 2/6/17 and that VM full. Denied needs at present and will call with questions.

## 2017-02-07 NOTE — TELEPHONE ENCOUNTER
No call documented from office. Call returned to patient. Mail box full. Unable to leave message. Advise patient if returns call.
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
## 2017-02-08 NOTE — TELEPHONE ENCOUNTER
Call returned to patient. Spoke with . HIPAA verified. Advised of [facility] appt today 12:30p. Verbalized understanding. Thanked for call.

## 2017-02-08 NOTE — TELEPHONE ENCOUNTER
Discussed with Dr. Nadira Go. Will order IV fluids and will plan to see patient in the infusion center. Please let patient know.

## 2017-02-08 NOTE — PROGRESS NOTES
Outpatient Infusion Center Short Visit Progress Note    3616 Pt add-on admit to [facility] for Hydration/1L/2hrs/labs via wheelchair accompanied by nurse aide and daughter. Visit Vitals    /61    Pulse 87    Temp 97 °F (36.1 °C)    Resp 18    SpO2 95%       PIV access established in R ac with positive blood return. Labs drawn and sent for processing, hydration started. Medications:  NS 1L over 2 hours    1535 Pt tolerated treatment well. D/c home in no distress. Pt aware of next appointment scheduled for 02/16/2017. Recent Results (from the past 12 hour(s))   CBC WITH AUTOMATED DIFF    Collection Time: 02/08/17 12:33 PM   Result Value Ref Range    WBC 7.1 3.6 - 11.0 K/uL    RBC 3.63 (L) 3.80 - 5.20 M/uL    HGB 11.2 (L) 11.5 - 16.0 g/dL    HCT 33.5 (L) 35.0 - 47.0 %    MCV 92.3 80.0 - 99.0 FL    MCH 30.9 26.0 - 34.0 PG    MCHC 33.4 30.0 - 36.5 g/dL    RDW 15.2 (H) 11.5 - 14.5 %    PLATELET 93 (L) 525 - 400 K/uL    NEUTROPHILS 94 (H) 32 - 75 %    LYMPHOCYTES 5 (L) 12 - 49 %    MONOCYTES 1 (L) 5 - 13 %    EOSINOPHILS 0 0 - 7 %    BASOPHILS 0 0 - 1 %    ABS. NEUTROPHILS 6.6 1.8 - 8.0 K/UL    ABS. LYMPHOCYTES 0.4 (L) 0.8 - 3.5 K/UL    ABS. MONOCYTES 0.1 0.0 - 1.0 K/UL    ABS. EOSINOPHILS 0.0 0.0 - 0.4 K/UL    ABS.  BASOPHILS 0.0 0.0 - 0.1 K/UL    DF SMEAR SCANNED      RBC COMMENTS ANISOCYTOSIS  1+       METABOLIC PANEL, COMPREHENSIVE    Collection Time: 02/08/17 12:33 PM   Result Value Ref Range    Sodium 134 (L) 136 - 145 mmol/L    Potassium 4.1 3.5 - 5.1 mmol/L    Chloride 96 (L) 97 - 108 mmol/L    CO2 26 21 - 32 mmol/L    Anion gap 12 5 - 15 mmol/L    Glucose 354 (H) 65 - 100 mg/dL    BUN 33 (H) 6 - 20 MG/DL    Creatinine 1.11 (H) 0.55 - 1.02 MG/DL    BUN/Creatinine ratio 30 (H) 12 - 20      GFR est AA 59 (L) >60 ml/min/1.73m2    GFR est non-AA 49 (L) >60 ml/min/1.73m2    Calcium 8.5 8.5 - 10.1 MG/DL    Bilirubin, total 1.3 (H) 0.2 - 1.0 MG/DL    ALT (SGPT) 20 12 - 78 U/L    AST (SGOT) 6 (L) 15 - 37 U/L    Alk.  phosphatase 68 45 - 117 U/L    Protein, total 5.2 (L) 6.4 - 8.2 g/dL    Albumin 2.8 (L) 3.5 - 5.0 g/dL    Globulin 2.4 2.0 - 4.0 g/dL    A-G Ratio 1.2 1.1 - 2.2     MAGNESIUM    Collection Time: 02/08/17 12:33 PM   Result Value Ref Range    Magnesium 1.7 1.6 - 2.4 mg/dL

## 2017-02-08 NOTE — TELEPHONE ENCOUNTER
HIPAA verified. Stated very little po intake over \"past few days\". Stated \"I am just so dry. I need some fluids. \"  Stated eating or drinking very little. Denied dysuria. Advised would forward to provider. Stated has transportation. Call to [facility]. Appt availabel 12:30p today for IVF. To provider.
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
## 2017-02-13 NOTE — TELEPHONE ENCOUNTER
HIPAA verified. Advised ok to hold iressa until office visit. Advised of need for stool sample collection. Will fax lab order to [facility] @ [phone]. Home health nurse in attendance. Support given. Lab order faxed complete.

## 2017-02-13 NOTE — TELEPHONE ENCOUNTER
Yes, should repeat labs on 2/16/17 prior to her appointment as ordered. Diarrhea can occur with Iressa. Please instruct patient/family to hold Iressa for now and we will re-evaluate at her appointment later this week.  Will also order C Diff sample to be sent which patient can do locally at a [facility].

## 2017-02-13 NOTE — TELEPHONE ENCOUNTER
Lalo Koo would like to know if since Sharon Friedman has had recent blood work if she still needs to have it done this week also.      [phone]
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
## 2017-02-16 NOTE — PROGRESS NOTES
Per patient & family preference, referral faxed complete to [facility] ([address] [place] [address] office) per hospice referral by provider.

## 2017-02-16 NOTE — PROGRESS NOTES
Liang Xiong is a 78 y.o. female here today for follow up of lung cancer. Patient is here in wheelchair today. Agitated. Upset because medications have been changed recently & many have been discontinued per patient. Daughter provided note with the following:     Feet are swollen, agitation, emotional outbursts, falls at home: 1x this week, 1x week before last, poor appetite, does not want to drink fluids, sleepiness, sleeps several hours per day, very weak & difficult to transfer.

## 2017-03-03 NOTE — TELEPHONE ENCOUNTER
Per hospice RN patient is actively dying. BP 60/- and unresponsive. To DC all meds and provide comfort care. RN requested hospice to convey our thoughts and prayers to the family. To provider.